# Patient Record
Sex: MALE | Race: WHITE | NOT HISPANIC OR LATINO | Employment: FULL TIME | URBAN - NONMETROPOLITAN AREA
[De-identification: names, ages, dates, MRNs, and addresses within clinical notes are randomized per-mention and may not be internally consistent; named-entity substitution may affect disease eponyms.]

---

## 2022-10-19 ENCOUNTER — OFFICE VISIT (OUTPATIENT)
Dept: ENDOCRINOLOGY | Facility: CLINIC | Age: 47
End: 2022-10-19

## 2022-10-19 VITALS
HEIGHT: 73 IN | WEIGHT: 173.1 LBS | BODY MASS INDEX: 22.94 KG/M2 | SYSTOLIC BLOOD PRESSURE: 128 MMHG | HEART RATE: 79 BPM | DIASTOLIC BLOOD PRESSURE: 90 MMHG | OXYGEN SATURATION: 99 %

## 2022-10-19 DIAGNOSIS — E11.65 TYPE 2 DIABETES MELLITUS WITH HYPERGLYCEMIA, WITH LONG-TERM CURRENT USE OF INSULIN: Primary | ICD-10-CM

## 2022-10-19 DIAGNOSIS — I10 PRIMARY HYPERTENSION: ICD-10-CM

## 2022-10-19 DIAGNOSIS — F17.200 SMOKER: ICD-10-CM

## 2022-10-19 DIAGNOSIS — Z79.4 TYPE 2 DIABETES MELLITUS WITH HYPERGLYCEMIA, WITH LONG-TERM CURRENT USE OF INSULIN: Primary | ICD-10-CM

## 2022-10-19 DIAGNOSIS — E78.2 MIXED HYPERLIPIDEMIA: ICD-10-CM

## 2022-10-19 PROCEDURE — 99204 OFFICE O/P NEW MOD 45 MIN: CPT | Performed by: NURSE PRACTITIONER

## 2022-10-19 RX ORDER — SEMAGLUTIDE 1.34 MG/ML
0.5 INJECTION, SOLUTION SUBCUTANEOUS WEEKLY
Qty: 6 ML | Refills: 11 | Status: SHIPPED | OUTPATIENT
Start: 2022-10-19

## 2022-10-19 RX ORDER — GLYBURIDE 5 MG/1
5 TABLET ORAL DAILY
COMMUNITY
Start: 2022-10-14

## 2022-10-19 RX ORDER — INSULIN GLARGINE 100 [IU]/ML
25 INJECTION, SOLUTION SUBCUTANEOUS DAILY
COMMUNITY
Start: 2022-10-14 | End: 2023-03-06 | Stop reason: SDUPTHER

## 2022-10-19 RX ORDER — ASPIRIN 81 MG/1
81 TABLET ORAL DAILY
COMMUNITY

## 2022-10-19 RX ORDER — ONDANSETRON 4 MG/1
4 TABLET, ORALLY DISINTEGRATING ORAL EVERY 8 HOURS PRN
Qty: 30 TABLET | Refills: 1 | Status: SHIPPED | OUTPATIENT
Start: 2022-10-19

## 2022-10-19 RX ORDER — ATORVASTATIN CALCIUM 20 MG/1
20 TABLET, FILM COATED ORAL
COMMUNITY
Start: 2022-10-14

## 2022-10-19 RX ORDER — NAPROXEN 500 MG/1
500 TABLET ORAL EVERY 12 HOURS PRN
COMMUNITY
Start: 2022-08-04

## 2022-10-19 RX ORDER — LOSARTAN POTASSIUM 100 MG/1
100 TABLET ORAL DAILY
COMMUNITY
Start: 2022-10-14

## 2022-10-19 NOTE — PROGRESS NOTES
"Chief Complaint  Diabetes    Subjective          Kamar Corral presents to [facility] ENDOCRINOLOGY  History of Present Illness       In office visit    Referring provider SAUD Matos    46-year-old male presents for consultation        Chief Complaint   Patient presents with   • Diabetes       HPI        Reason -- diabetes mellitus type II    Duration--  Diagnosed in 2018     Context --- presented with increase urination     Timing constant    Quality not controlled    Severity moderate     Macrovascular complications---none     Microvascular complications --- mild neuropathy , no DR, no renal disease       Current diabetes regimen     Oral medications, basal insulin       Current glucose monitoring     Fingerstick    2 times daily     Am readings 100 up to 180     Has occasional low sugar       Review of Systems - General ROS: negative      Objective   Vital Signs:   /90   Pulse 79   Ht 185.4 cm (73\")   Wt 78.5 kg (173 lb 1.6 oz)   SpO2 99%   BMI 22.84 kg/m²     Physical Exam  Constitutional:       Appearance: Normal appearance.   Cardiovascular:      Rate and Rhythm: Regular rhythm.      Heart sounds: Normal heart sounds.   Pulmonary:      Breath sounds: Normal breath sounds.   Musculoskeletal:         General: Normal range of motion.   Neurological:      Mental Status: He is alert.        Result Review :   The following data was reviewed by: SAUD Crooks on 10/19/2022:                Assessment and Plan    Diagnoses and all orders for this visit:    1. Type 2 diabetes mellitus with hyperglycemia, with long-term current use of insulin (HCC) (Primary)  -     CBC & Differential; Future  -     Comprehensive Metabolic Panel; Future  -     Hemoglobin A1c; Future  -     Lipid Panel; Future  -     Microalbumin / Creatinine Urine Ratio - Urine, Clean Catch; Future  -     TSH; Future    2. Mixed hyperlipidemia  -     CBC & Differential; Future  -    "  Comprehensive Metabolic Panel; Future  -     Hemoglobin A1c; Future  -     Lipid Panel; Future  -     Microalbumin / Creatinine Urine Ratio - Urine, Clean Catch; Future  -     TSH; Future    3. Primary hypertension    4. Smoker    Other orders  -     ondansetron ODT (Zofran ODT) 4 MG disintegrating tablet; Place 1 tablet on the tongue Every 8 (Eight) Hours As Needed for Nausea or Vomiting.  Dispense: 30 tablet; Refill: 1  -     Semaglutide,0.25 or 0.5MG/DOS, (Ozempic, 0.25 or 0.5 MG/DOSE,) 2 MG/1.5ML solution pen-injector; Inject 0.5 mg under the skin into the appropriate area as directed 1 (One) Time Per Week. 0.5 mg weekly  Dispense: 6 mL; Refill: 11                 Glycemic Management:      Last HgbA1c 10% in March 2022     Diabetes mellitus type 2    Taking glyburide 5 mg daily --- stop today           Taking lantus 22 units every night ---- decrease to 18 units     Taking metformin 1000 mg bid     Start ozempic 0.25 mg once weekly for 4 weeks then 0.5 mg weekly    Dicussed side effects     Called in zofran       Next add jardiance or farxiga         Goals for sugar    Fasting and before meals 80 to 130    2 hours after meals 180 or less      Aim for 45 grams of carbohydrate per meal    Aim for 15 grams of carbohydrate per snack         Microvascular Complications Monitoring       Last eye exam--- Dec. 2021 , no DR     Neuropathy -- yes     No renal disease       Lipid Management:     Hyperlipidemia     Taking atorvastatin 40 mg one at night     Blood Pressure Management:    Hypertension      Taking losartan 100 mg daily     Taking Norvasc 10 mg daily         Thyroid Health      No results found for: TSH        Bone Health       Vitamin d def    Taking vitamin d supplement       Weight Management:     Normal weight       Preventive Care:     Smoking     Kamar Corral  reports that he has been smoking cigarettes. He has a 35.00 pack-year smoking history. He has never used smokeless tobacco.. I have educated  him on the risk of diseases from using tobacco products such as cancer and COPD.     I advised him to quit and he is not willing to quit.    I spent 3  minutes counseling the patient.       Records from Dr. Hernández received and reviewed from 2022  Thank you for this consultation         Follow Up   Return in about 3 months (around 1/19/2023) for Recheck.  Patient was given instructions and counseling regarding his condition or for health maintenance advice. Please see specific information pulled into the AVS if appropriate.         This document has been electronically signed by SAUD Crooks on October 19, 2022 09:24 CDT.

## 2023-03-06 RX ORDER — INSULIN GLARGINE 100 [IU]/ML
25 INJECTION, SOLUTION SUBCUTANEOUS DAILY
Qty: 10 ML | Refills: 3 | Status: SHIPPED | OUTPATIENT
Start: 2023-03-06

## 2023-03-06 NOTE — TELEPHONE ENCOUNTER
Incoming Refill Request      Medication requested (name and dose): Lantus SoloStar 100 UNIT/ML injection pen    Pharmacy where request should be sent: [facility] on [address] [facility] in [place]    Additional details provided by patient: none    Best call back number: [phone]    Does the patient have less than a 3 day supply:  [x] Yes  [] No    Jazmyn Massey Rep  03/06/23, 10:54 CST

## 2023-03-27 ENCOUNTER — TELEPHONE (OUTPATIENT)
Dept: ENDOCRINOLOGY | Facility: CLINIC | Age: 48
End: 2023-03-27
Payer: COMMERCIAL

## 2023-05-16 ENCOUNTER — TELEMEDICINE (OUTPATIENT)
Dept: ENDOCRINOLOGY | Facility: CLINIC | Age: 48
End: 2023-05-16
Payer: COMMERCIAL

## 2023-05-16 DIAGNOSIS — E11.65 TYPE 2 DIABETES MELLITUS WITH HYPERGLYCEMIA, WITH LONG-TERM CURRENT USE OF INSULIN: Primary | ICD-10-CM

## 2023-05-16 DIAGNOSIS — E78.2 MIXED HYPERLIPIDEMIA: ICD-10-CM

## 2023-05-16 DIAGNOSIS — I10 PRIMARY HYPERTENSION: ICD-10-CM

## 2023-05-16 DIAGNOSIS — Z79.4 TYPE 2 DIABETES MELLITUS WITH HYPERGLYCEMIA, WITH LONG-TERM CURRENT USE OF INSULIN: Primary | ICD-10-CM

## 2023-05-16 RX ORDER — BLOOD-GLUCOSE SENSOR
1 EACH MISCELLANEOUS AS NEEDED
Qty: 3 EACH | Refills: 11 | Status: SHIPPED | OUTPATIENT
Start: 2023-05-16

## 2023-05-16 RX ORDER — BLOOD-GLUCOSE SENSOR
1 EACH MISCELLANEOUS
Qty: 2 EACH | Refills: 11 | Status: SHIPPED | OUTPATIENT
Start: 2023-05-16

## 2023-05-16 RX ORDER — LOSARTAN POTASSIUM 100 MG/1
100 TABLET ORAL DAILY
Qty: 90 TABLET | Refills: 3 | Status: SHIPPED | OUTPATIENT
Start: 2023-05-16

## 2023-05-16 RX ORDER — ATORVASTATIN CALCIUM 20 MG/1
20 TABLET, FILM COATED ORAL
Qty: 90 TABLET | Refills: 3 | Status: SHIPPED | OUTPATIENT
Start: 2023-05-16

## 2023-05-16 NOTE — PROGRESS NOTES
Chief Complaint  Type 2 Diabetes      HPI        Reason -- diabetes mellitus type II    Duration--  Diagnosed in 2018     Context --- presented with increase urination     Timing constant    Quality not controlled    Severity moderate     Macrovascular complications---none     Microvascular complications --- mild neuropathy , no DR, no renal disease       Current diabetes regimen     Oral medications, basal insulin   Didn't tolerate ozempic     Current glucose monitoring     Fingerstick    2 times daily     Am readings 100 up to 180     Has occasional low sugar     PE    There were no vitals taken for this visit.  AOx3  No visible goiter  Normal Respiratory Effort   No Edema    Labs    No results found for: WBC, HGB, HCT, MCV, PLT  No results found for: GLUCOSE, BUN, CREATININE, EGFRIFNONA, EGFRIFAFRI, BCR, NA, K, CO2, CALCIUM, PROTENTOTREF, ALBUMIN, LABIL2, BILIRUBIN, AST, ALT      ICD-10-CM ICD-9-CM   1. Type 2 diabetes mellitus with hyperglycemia, with long-term current use of insulin  E11.65 250.00    Z79.4 790.29     V58.67   2. Mixed hyperlipidemia  E78.2 272.2   3. Primary hypertension  I10 401.9               Glycemic Management:      Last HgbA1c 10% in March 2022   No results found for: HGBA1C      Taking glyburide 5 mg daily --- stopped on first visit by Ajay     Taking lantus 22 units every night ---- decrease to 18 units   Self titration     Taking metformin 1000 mg bid , refers no side effect so no change to XR     Start ozempic 0.25 mg once weekly for 4 weeks then 0.5 mg weekly- couldn't tolerate     We spoke about options    He prefers G7 or michael 3 and monitoring before any changes    Options    Adding slgt 2 I  adding inhaled insulin  Trying soliqua or xultophy since intolerance to ozempic     Microvascular Complications Monitoring       Last eye exam--- Dec. 2021 , no DR     Neuropathy -- yes     No renal disease       Lipid Management:     Hyperlipidemia     Taking atorvastatin 40 mg one at night      Blood Pressure Management:    Hypertension      Taking losartan 100 mg daily     Taking Norvasc 10 mg daily         Thyroid Health      No results found for: TSH        Bone Health       Vitamin d def    Taking vitamin d supplement     Patient is smoking,   No counseling provided this appt     New Medications Ordered This Visit   Medications   • Continuous Blood Gluc Sensor (Dexcom G7 Sensor) misc     Si each As Needed (glucose monitoring). Every 10 days     Dispense:  3 each     Refill:  11   • Continuous Blood Gluc Sensor (FreeStyle Saurav 3 Sensor) misc     Si each Every 14 (Fourteen) Days.     Dispense:  2 each     Refill:  11   • atorvastatin (LIPITOR) 20 MG tablet     Sig: Take 1 tablet by mouth every night at bedtime.     Dispense:  90 tablet     Refill:  3   • losartan (COZAAR) 100 MG tablet     Sig: Take 1 tablet by mouth Daily.     Dispense:  90 tablet     Refill:  3       Orders Placed This Encounter   Procedures   • CBC Auto Differential   • Comprehensive Metabolic Panel   • Hemoglobin A1c   • Lipid Panel   • Microalbumin / Creatinine Urine Ratio - Urine, Clean Catch   • TSH   • Vitamin B12             This document has been electronically signed by Luan Washington MD on May 21, 2023 08:31 CDT

## 2023-09-11 ENCOUNTER — LAB (OUTPATIENT)
Dept: LAB | Facility: HOSPITAL | Age: 48
End: 2023-09-11
Payer: COMMERCIAL

## 2023-09-11 PROCEDURE — 82607 VITAMIN B-12: CPT | Performed by: INTERNAL MEDICINE

## 2023-09-11 PROCEDURE — 80061 LIPID PANEL: CPT | Performed by: INTERNAL MEDICINE

## 2023-09-11 PROCEDURE — 80050 GENERAL HEALTH PANEL: CPT | Performed by: INTERNAL MEDICINE

## 2023-09-11 PROCEDURE — 83036 HEMOGLOBIN GLYCOSYLATED A1C: CPT | Performed by: INTERNAL MEDICINE

## 2023-09-11 PROCEDURE — 85007 BL SMEAR W/DIFF WBC COUNT: CPT | Performed by: INTERNAL MEDICINE

## 2023-09-12 LAB
ACANTHOCYTES BLD QL SMEAR: NORMAL
ALBUMIN SERPL-MCNC: 4.5 G/DL (ref 3.5–5.2)
ALBUMIN/GLOB SERPL: 1.4 G/DL
ALP SERPL-CCNC: 80 U/L (ref 39–117)
ALT SERPL W P-5'-P-CCNC: 29 U/L (ref 1–41)
ANION GAP SERPL CALCULATED.3IONS-SCNC: 15 MMOL/L (ref 5–15)
AST SERPL-CCNC: 29 U/L (ref 1–40)
BASOPHILS # BLD AUTO: 0.04 10*3/MM3 (ref 0–0.2)
BASOPHILS NFR BLD AUTO: 0.4 % (ref 0–1.5)
BILIRUB SERPL-MCNC: 1 MG/DL (ref 0–1.2)
BUN SERPL-MCNC: 13 MG/DL (ref 6–20)
BUN/CREAT SERPL: 15.7 (ref 7–25)
CALCIUM SPEC-SCNC: 9.8 MG/DL (ref 8.6–10.5)
CHLORIDE SERPL-SCNC: 100 MMOL/L (ref 98–107)
CHOLEST SERPL-MCNC: 172 MG/DL (ref 0–200)
CO2 SERPL-SCNC: 23 MMOL/L (ref 22–29)
CREAT SERPL-MCNC: 0.83 MG/DL (ref 0.76–1.27)
DEPRECATED RDW RBC AUTO: 54.4 FL (ref 37–54)
EGFRCR SERPLBLD CKD-EPI 2021: 107.3 ML/MIN/1.73
EOSINOPHIL # BLD AUTO: 0.44 10*3/MM3 (ref 0–0.4)
EOSINOPHIL NFR BLD AUTO: 4.4 % (ref 0.3–6.2)
ERYTHROCYTE [DISTWIDTH] IN BLOOD BY AUTOMATED COUNT: 15.2 % (ref 12.3–15.4)
GLOBULIN UR ELPH-MCNC: 3.2 GM/DL
GLUCOSE SERPL-MCNC: 225 MG/DL (ref 65–99)
HBA1C MFR BLD: 8.2 % (ref 4.8–5.6)
HCT VFR BLD AUTO: 49.8 % (ref 37.5–51)
HDLC SERPL-MCNC: 112 MG/DL (ref 40–60)
HGB BLD-MCNC: 16.7 G/DL (ref 13–17.7)
IMM GRANULOCYTES # BLD AUTO: 0.03 10*3/MM3 (ref 0–0.05)
IMM GRANULOCYTES NFR BLD AUTO: 0.3 % (ref 0–0.5)
LDLC SERPL CALC-MCNC: 49 MG/DL (ref 0–100)
LDLC/HDLC SERPL: 0.44 {RATIO}
LYMPHOCYTES # BLD AUTO: 2.42 10*3/MM3 (ref 0.7–3.1)
LYMPHOCYTES NFR BLD AUTO: 24.2 % (ref 19.6–45.3)
MCH RBC QN AUTO: 32.4 PG (ref 26.6–33)
MCHC RBC AUTO-ENTMCNC: 33.5 G/DL (ref 31.5–35.7)
MCV RBC AUTO: 96.7 FL (ref 79–97)
MONOCYTES # BLD AUTO: 0.64 10*3/MM3 (ref 0.1–0.9)
MONOCYTES NFR BLD AUTO: 6.4 % (ref 5–12)
NEUTROPHILS NFR BLD AUTO: 6.43 10*3/MM3 (ref 1.7–7)
NEUTROPHILS NFR BLD AUTO: 64.3 % (ref 42.7–76)
NRBC BLD AUTO-RTO: 0 /100 WBC (ref 0–0.2)
PLATELET # BLD AUTO: 137 10*3/MM3 (ref 140–450)
PMV BLD AUTO: 12.8 FL (ref 6–12)
POTASSIUM SERPL-SCNC: 4.3 MMOL/L (ref 3.5–5.2)
PROT SERPL-MCNC: 7.7 G/DL (ref 6–8.5)
RBC # BLD AUTO: 5.15 10*6/MM3 (ref 4.14–5.8)
SMALL PLATELETS BLD QL SMEAR: NORMAL
SODIUM SERPL-SCNC: 138 MMOL/L (ref 136–145)
TRIGL SERPL-MCNC: 55 MG/DL (ref 0–150)
TSH SERPL DL<=0.05 MIU/L-ACNC: 1.06 UIU/ML (ref 0.27–4.2)
VIT B12 BLD-MCNC: 1012 PG/ML (ref 211–946)
VLDLC SERPL-MCNC: 11 MG/DL (ref 5–40)
WBC MORPH BLD: NORMAL
WBC NRBC COR # BLD: 10 10*3/MM3 (ref 3.4–10.8)

## 2023-09-19 ENCOUNTER — OFFICE VISIT (OUTPATIENT)
Dept: ENDOCRINOLOGY | Facility: CLINIC | Age: 48
End: 2023-09-19
Payer: COMMERCIAL

## 2023-09-19 VITALS
HEIGHT: 73 IN | BODY MASS INDEX: 21.74 KG/M2 | HEART RATE: 85 BPM | WEIGHT: 164 LBS | OXYGEN SATURATION: 98 % | DIASTOLIC BLOOD PRESSURE: 100 MMHG | SYSTOLIC BLOOD PRESSURE: 150 MMHG

## 2023-09-19 DIAGNOSIS — I10 PRIMARY HYPERTENSION: ICD-10-CM

## 2023-09-19 DIAGNOSIS — D69.6 THROMBOCYTOPENIA: ICD-10-CM

## 2023-09-19 DIAGNOSIS — Z79.4 TYPE 2 DIABETES MELLITUS WITH HYPERGLYCEMIA, WITH LONG-TERM CURRENT USE OF INSULIN: Primary | ICD-10-CM

## 2023-09-19 DIAGNOSIS — E11.65 TYPE 2 DIABETES MELLITUS WITH HYPERGLYCEMIA, WITH LONG-TERM CURRENT USE OF INSULIN: Primary | ICD-10-CM

## 2023-09-19 DIAGNOSIS — E78.2 MIXED HYPERLIPIDEMIA: ICD-10-CM

## 2023-09-19 PROCEDURE — 99214 OFFICE O/P EST MOD 30 MIN: CPT | Performed by: INTERNAL MEDICINE

## 2023-09-19 RX ORDER — INSULIN LISPRO-AABC 100 [IU]/ML
INJECTION, SOLUTION SUBCUTANEOUS
Qty: 15 ML | Refills: 11 | Status: SHIPPED | OUTPATIENT
Start: 2023-09-19

## 2023-09-19 RX ORDER — INSULIN PMP CART,AUT,G6/7,CNTR
1 EACH SUBCUTANEOUS ONCE
Qty: 1 KIT | Refills: 1 | Status: SHIPPED | OUTPATIENT
Start: 2023-09-19 | End: 2023-09-19

## 2023-09-19 RX ORDER — PEN NEEDLE, DIABETIC 30 GX3/16"
1 NEEDLE, DISPOSABLE MISCELLANEOUS 4 TIMES DAILY
Qty: 120 EACH | Refills: 11 | Status: SHIPPED | OUTPATIENT
Start: 2023-09-19

## 2023-09-19 RX ORDER — INSULIN GLARGINE 300 U/ML
80 INJECTION, SOLUTION SUBCUTANEOUS DAILY
Qty: 24 ML | Refills: 3 | Status: SHIPPED | OUTPATIENT
Start: 2023-09-19

## 2023-09-19 RX ORDER — INSULIN PMP CART,AUT,G6/7,CNTR
1 EACH SUBCUTANEOUS
Qty: 10 EACH | Refills: 11 | Status: SHIPPED | OUTPATIENT
Start: 2023-09-19

## 2023-09-19 NOTE — PROGRESS NOTES
"Chief Complaint  Type 2 Diabetes                           HPI        Reason -- diabetes mellitus type II    Duration--  Diagnosed in 2018     Context --- presented with increase urination     Timing constant    Quality not controlled    Severity moderate     Macrovascular complications---none     Microvascular complications --- mild neuropathy , no DR, no renal disease       Current diabetes regimen      basal insulin   Didn't tolerate ozempic     Current glucose monitoring     Using michael 3      PE    /100   Pulse 85   Ht 185.4 cm (73\")   Wt 74.4 kg (164 lb)   SpO2 98%   BMI 21.64 kg/m²   AOx3  No visible goiter  Normal Respiratory Effort   No Edema    Labs    Lab Results   Component Value Date    WBC 10.00 09/11/2023    HGB 16.7 09/11/2023    HCT 49.8 09/11/2023    MCV 96.7 09/11/2023     (L) 09/11/2023     Lab Results   Component Value Date    GLUCOSE 225 (H) 09/11/2023    BUN 13 09/11/2023    CREATININE 0.83 09/11/2023    BCR 15.7 09/11/2023     09/11/2023    K 4.3 09/11/2023    CO2 23.0 09/11/2023    CALCIUM 9.8 09/11/2023    ALBUMIN 4.5 09/11/2023    AST 29 09/11/2023    ALT 29 09/11/2023         ICD-10-CM ICD-9-CM   1. Type 2 diabetes mellitus with hyperglycemia, with long-term current use of insulin  E11.65 250.00    Z79.4 790.29     V58.67   2. Mixed hyperlipidemia  E78.2 272.2   3. Primary hypertension  I10 401.9               Glycemic Management:      Last HgbA1c 10% in March 2022   Lab Results   Component Value Date    HGBA1C 8.20 (H) 09/11/2023         Taking glyburide 5 mg daily --- stopped on first visit by Ajay     Taking lantus 22 units every night ---- decrease to 18 units   Self titration     Taking metformin 1000 mg bid - stopped     Start ozempic 0.25 mg once weekly for 4 weeks then 0.5 mg weekly- couldn't tolerate     We spoke about options    He is smoking , no afrezza    Added lyumjev 1 per 15 grams and 1 per 50     Microvascular Complications Monitoring       Last " eye exam--- Dec. 2021 , no DR     Neuropathy -- yes     No renal disease       Lipid Management:     Hyperlipidemia   Lab Results   Component Value Date    CHOL 172 09/11/2023    TRIG 55 09/11/2023     (H) 09/11/2023    LDL 49 09/11/2023       Taking atorvastatin 40 mg one at night   Stop     Blood Pressure Management:    Elevated Today     Recheck       Taking losartan 100 mg daily     Taking Norvasc 10 mg daily         Thyroid Health      Lab Results   Component Value Date    TSH 1.060 09/11/2023           Bone Health       Vitamin d def    Taking vitamin d supplement     Patient is smoking,   Suggested to stop     Chest CT   ===================    Thrombocytopenia    Will decrease walnuts and ETOH  B12 is nl               No orders of the defined types were placed in this encounter.      No orders of the defined types were placed in this encounter.            This document has been electronically signed by Luan Washington MD on September 19, 2023 08:54 CDT

## 2023-09-19 NOTE — PATIENT INSTRUCTIONS
Please take 1unit of lyumjev for every 15 grams of carbohydrate consumed    Ideally you should be able to eat a healthy meal of about 60 grams of carbohydrate     You can add extra if high as follows    Every 1 unit assume drops the sugar 50 points